# Patient Record
(demographics unavailable — no encounter records)

---

## 2025-01-15 NOTE — PHYSICAL EXAM
[Well-appearing] : well-appearing [Normocephalic] : normocephalic [No dysmorphic facial features] : no dysmorphic facial features [No ocular abnormalities] : no ocular abnormalities [Neck supple] : neck supple [Lungs clear] : lungs clear [Heart sounds regular in rate and rhythm] : heart sounds regular in rate and rhythm [Soft] : soft [No organomegaly] : no organomegaly [No abnormal neurocutaneous stigmata or skin lesions] : no abnormal neurocutaneous stigmata or skin lesions [Straight] : straight [No deformities] : no deformities [Alert] : alert [Well related, good eye contact] : well related, good eye contact [Conversant] : conversant [Normal speech and language] : normal speech and language [Follows instructions well] : follows instructions well [VFF] : VFF [Pupils reactive to light and accommodation] : pupils reactive to light and accommodation [Full extraocular movements] : full extraocular movements [Saccadic and smooth pursuits intact] : saccadic and smooth pursuits intact [No nystagmus] : no nystagmus [No papilledema] : no papilledema [Normal facial sensation to light touch] : normal facial sensation to light touch [No facial asymmetry or weakness] : no facial asymmetry or weakness [Gross hearing intact] : gross hearing intact [Equal palate elevation] : equal palate elevation [Good shoulder shrug] : good shoulder shrug [Normal tongue movement] : normal tongue movement [Midline tongue, no fasciculations] : midline tongue, no fasciculations [Normal axial and appendicular muscle tone] : normal axial and appendicular muscle tone [Gets up on table without difficulty] : gets up on table without difficulty [No pronator drift] : no pronator drift [Normal finger tapping and fine finger movements] : normal finger tapping and fine finger movements [No abnormal involuntary movements] : no abnormal involuntary movements [5/5 strength in proximal and distal muscles of arms and legs] : 5/5 strength in proximal and distal muscles of arms and legs [Walks and runs well] : walks and runs well [2+ biceps] : 2+ biceps [Triceps] : triceps [Knee jerks] : knee jerks [Localizes LT and temperature] : localizes LT and temperature [No dysmetria on FTNT] : no dysmetria on FTNT [Good walking balance] : good walking balance [Normal gait] : normal gait [Negative Romberg] : negative Romberg

## 2025-01-15 NOTE — BIRTH HISTORY
[At ___ Weeks Gestation] : at [unfilled] weeks gestation [[place] [ Section] : by  section [None] : there were no delivery complications [Age Appropriate] : age appropriate developmental milestones met [de-identified] : Premature labor at 28 weeks, bedrest 5 weeks

## 2025-01-15 NOTE — HISTORY OF PRESENT ILLNESS
[FreeTextEntry1] : Moira presents for evaluation of headaches. Headaches have been occurring since around April of last year and recently have been on a daily basis.  She describes multifocal pain that is dull in quality.  Pain waxes and wanes in intensity throughout the day.  For the last few weeks she states that headaches have been on a daily basis.  They do not prevent her from falling asleep nor do they awaken her from sleep.  She has associated dizziness but no nausea or vomiting.  She denies any associated vision changes, hearing changes, weakness or other sensory disturbances.  Initially she did take Motrin but has not taken any medications for a number of months as she feels it "does not work".  She did have to give up tennis which the activity level tended to make the headaches worse.  She has not missed out on any school days due to the headaches.  Of note she was evaluated by Dr. Mcadams who had her undergo MRI brain that was reportedly normal.

## 2025-01-15 NOTE — ASSESSMENT
[FreeTextEntry1] : 18 yo with history of chronic headaches in setting of nonfocal neurologic exam and normal MRI brain. Discussed, that likely cause of headaches is her poor nutrition and hydration. Discussed that these can also contribute to episodic dizziness.  If she is unable to make improvements in her nutritional status I discussed need for evaluation through a nutrition specialist.  Otherwise does not require further neurologic workup at this time.

## 2025-01-15 NOTE — REVIEW OF SYSTEMS
[Normal] : Psychiatric [FreeTextEntry7] : Can go all day without eating or drinking anything  [FreeTextEntry8] : Sleeps well overnight